# Patient Record
Sex: FEMALE | Race: WHITE | NOT HISPANIC OR LATINO | Employment: OTHER | URBAN - METROPOLITAN AREA
[De-identification: names, ages, dates, MRNs, and addresses within clinical notes are randomized per-mention and may not be internally consistent; named-entity substitution may affect disease eponyms.]

---

## 2017-04-24 ENCOUNTER — OFFICE VISIT (OUTPATIENT)
Dept: FAMILY MEDICINE | Facility: CLINIC | Age: 62
End: 2017-04-24
Payer: COMMERCIAL

## 2017-04-24 VITALS
OXYGEN SATURATION: 99 % | HEIGHT: 66 IN | HEART RATE: 59 BPM | WEIGHT: 162 LBS | DIASTOLIC BLOOD PRESSURE: 64 MMHG | SYSTOLIC BLOOD PRESSURE: 100 MMHG | TEMPERATURE: 98.3 F | BODY MASS INDEX: 26.03 KG/M2

## 2017-04-24 DIAGNOSIS — H16.9 KERATITIS: Primary | ICD-10-CM

## 2017-04-24 PROCEDURE — 99213 OFFICE O/P EST LOW 20 MIN: CPT | Performed by: FAMILY MEDICINE

## 2017-04-24 RX ORDER — POLYMYXIN B SULFATE AND TRIMETHOPRIM 1; 10000 MG/ML; [USP'U]/ML
SOLUTION OPHTHALMIC
Refills: 0 | COMMUNITY
Start: 2017-04-15

## 2017-04-24 NOTE — PROGRESS NOTES
SUBJECTIVE:                                                    Stephanie Acuna is a 61 year old female who presents to clinic today for the following health issues:      Eye(s) Problem     Onset: 12 days. She was seen in  and was told that there is some swelling in the eye conjunctiva.    Description:   Location: right  Pain: no   Redness: no     Accompanying Signs & Symptoms:  Discharge/mattering: YES  Swelling: YES  Visual changes: YES  Fever: no   Nasal Congestion: no   Bothered by bright lights: YES     History:   Trauma: no   Foreign body exposure: no     Precipitating factors:   Wearing contacts: no     Alleviating factors:  Improved by: drops        Therapies Tried and outcome: Allergy eye drops & antibiotics drops x 10 days.        Problem list and histories reviewed & adjusted, as indicated.  Additional history: as documented    Patient Active Problem List   Diagnosis     Seborrheic dermatitis     Chondromalacia of patella     Chronic sinusitis     Symptomatic menopausal or female climacteric states     CARDIOVASCULAR SCREENING; LDL GOAL LESS THAN 160     Osteopenia     Advanced directives, counseling/discussion     Eczema     IT band syndrome, right     Right knee pain     Past Surgical History:   Procedure Laterality Date     COLONOSCOPY  11/2009    normal, repeat 7-10 years     HC EXCIS BARTHOLIN GLAND/CYST  early 80s    left     HC KNEE SCOPE, DIAGNOSTIC  1995    right arthroscopy/lateral release     INJECTION BURSA  11/2009    left trochanteric bursa injection     SURGICAL HISTORY OF -   1971    right supraclavicular lipoma excised       Social History   Substance Use Topics     Smoking status: Never Smoker     Smokeless tobacco: Never Used     Alcohol use 0.0 oz/week     0 Standard drinks or equivalent per week      Comment: 2-5 glass of wine per week     Family History   Problem Relation Age of Onset     Breast Cancer Mother      onset 76     Prostate Cancer Father      onset age 76      "Hypertension Mother      Respiratory Father       of aspiration pneumonia     HEART DISEASE Mother      pacemaker mid 80s     Neurologic Disorder Father      dementia, onset 80s/seizures(Dilantin) - benign tumor/noise related hearing loss     CEREBROVASCULAR DISEASE Mother      onset age 90         Current Outpatient Prescriptions   Medication Sig Dispense Refill     trimethoprim-polymyxin b (POLYTRIM) ophthalmic solution PLACE 1 DROP INTO AFFECTED EYE QID FOR BACTERIAL CONJUNCTIVITIS  0     naphazoline (NAPHCON) 0.1 % ophthalmic solution 1 drop 4 times daily as needed for irritation       Omega-3 Fatty Acids (OMEGA-3 FISH OIL PO)        hydrocortisone valerate (WESTCORT) 0.2 % ointment Apply thin layer to affected areas on face twice daily as needed 45 g 2     zolpidem (AMBIEN) 5 MG tablet Take 0.5-1 tablets (2.5-5 mg) by mouth nightly as needed for sleep 30 tablet 5     ibuprofen (ADVIL) 200 MG tablet Take 600 mg by mouth daily as needed. Prior to golfing       VITAMINS/MINERALS OR TABS 1 TABLET DAILY 30 0     OSCAL 500/200 D-3 500-200 MG-UNIT OR TABS 1 TABLET DAILY 0 0     Allergies   Allergen Reactions     Peanuts [Nuts]      migraine headache     Erythro [Erythromycin] GI Disturbance     Shellfish Allergy Nausea and Vomiting       Reviewed and updated as needed this visit by clinical staff       Reviewed and updated as needed this visit by Provider         ROS:  C: NEGATIVE for fever, chills, change in weight  E/M: NEGATIVE for ear, mouth and throat problems  R: NEGATIVE for significant cough or SOB  CV: NEGATIVE for chest pain, palpitations or peripheral edema    OBJECTIVE:                                                    /64 (BP Location: Right arm, Cuff Size: Adult Regular)  Pulse 59  Temp 98.3  F (36.8  C) (Tympanic)  Ht 5' 6\" (1.676 m)  Wt 162 lb (73.5 kg)  SpO2 99%  BMI 26.15 kg/m2  Body mass index is 26.15 kg/(m^2).   GENERAL: healthy, alert, well nourished, well hydrated, no " distress  EYES: Eyes grossly normal to inspection, extraocular movements - intact, and PERRL. Examined eye with fluorescin drops. No tears noted.    HENT: ear canals- normal; TMs- normal; Nose- normal; Mouth- no ulcers, no lesions  NECK: no tenderness, no adenopathy, no asymmetry, no masses, no stiffness; thyroid- normal to palpation  RESP: lungs clear to auscultation - no rales, no rhonchi, no wheezes  CV: regular rates and rhythm, normal S1 S2, no S3 or S4 and no murmur, no click or rub -         ASSESSMENT/PLAN:                                                        ICD-10-CM    1. Keratitis H16.9      Improved symptoms. Stop antibiotic eye drops.   Resume anti allergy eye drops.  If any worsening of symptoms noted, recommended to notify me back.  Follow up with Provider - as needed     Harsha Meyer MD  [facility]

## 2017-04-24 NOTE — NURSING NOTE
"Chief Complaint   Patient presents with     Eye Problem       Initial /64 (BP Location: Right arm, Cuff Size: Adult Regular)  Pulse 59  Temp 98.3  F (36.8  C) (Tympanic)  Ht 5' 6\" (1.676 m)  Wt 162 lb (73.5 kg)  SpO2 99%  BMI 26.15 kg/m2 Estimated body mass index is 26.15 kg/(m^2) as calculated from the following:    Height as of this encounter: 5' 6\" (1.676 m).    Weight as of this encounter: 162 lb (73.5 kg).  Medication Reconciliation: complete  "

## 2017-04-24 NOTE — MR AVS SNAPSHOT
"              After Visit Summary   4/24/2017    Stephanie Acuna    MRN: [id number]           Patient Information     Date Of Birth          1955        Visit Information        Provider Department      4/24/2017 10:40 AM Harsha Meyer MD [facility] [place]        Today's Diagnoses     Keratitis    -  1       Follow-ups after your visit        Who to contact     If you have questions or need follow up information about today's clinic visit or your schedule please contact [facility] [place] directly at [phone].  Normal or non-critical lab and imaging results will be communicated to you by Survatahart, letter or phone within 4 business days after the clinic has received the results. If you do not hear from us within 7 days, please contact the clinic through Jareet or phone. If you have a critical or abnormal lab result, we will notify you by phone as soon as possible.  Submit refill requests through ION Signature or call your pharmacy and they will forward the refill request to us. Please allow 3 business days for your refill to be completed.          Additional Information About Your Visit        MyChart Information     ION Signature gives you secure access to your electronic health record. If you see a primary care provider, you can also send messages to your care team and make appointments. If you have questions, please call your primary care clinic.  If you do not have a primary care provider, please call [phone] and they will assist you.        Care EveryWhere ID     This is your Care EveryWhere ID. This could be used by other organizations to access your [facility] medical records  [id number]        Your Vitals Were     Pulse Temperature Height Pulse Oximetry BMI (Body Mass Index)       59 98.3  F (36.8  C) (Tympanic) 5' 6\" (1.676 m) 99% 26.15 kg/m2        Blood Pressure from Last 3 Encounters:   04/24/17 100/64   08/01/16 97/64   09/30/15 105/63    Weight from Last 3 Encounters: "   04/24/17 162 lb (73.5 kg)   08/01/16 158 lb 6.4 oz (71.8 kg)   09/30/15 154 lb 6 oz (70 kg)              Today, you had the following     No orders found for display         Today's Medication Changes          These changes are accurate as of: 4/24/17 11:59 PM.  If you have any questions, ask your nurse or doctor.               Stop taking these medicines if you haven't already. Please contact your care team if you have questions.     meclizine 25 MG tablet   Commonly known as:  ANTIVERT   Stopped by:  Harsha Meyer MD                    Primary Care Provider Office Phone # Fax #    Harsha Meyer [phone]       [address]  [place] [address]        Thank you!     Thank you for choosing [facility]  for your care. Our goal is always to provide you with excellent care. Hearing back from our patients is one way we can continue to improve our services. Please take a few minutes to complete the written survey that you may receive in the mail after your visit with us. Thank you!             Your Updated Medication List - Protect others around you: Learn how to safely use, store and throw away your medicines at www.disposemymeds.org.          This list is accurate as of: 4/24/17 11:59 PM.  Always use your most recent med list.                   Brand Name Dispense Instructions for use    ADVIL 200 MG tablet   Generic drug:  ibuprofen      Take 600 mg by mouth daily as needed. Prior to golfing       hydrocortisone valerate 0.2 % ointment    WESTCORT    45 g    Apply thin layer to affected areas on face twice daily as needed       naphazoline 0.1 % ophthalmic solution    NAPHCON     1 drop 4 times daily as needed for irritation       OMEGA-3 FISH OIL PO          OSCAL 500/200 D-3 500-200 MG-UNIT per tablet   Generic drug:  calcium carb 1250 mg (500 mg [place])/vitamin D 200 unit     0    1 TABLET DAILY       trimethoprim-polymyxin b ophthalmic  solution    POLYTRIM     PLACE 1 DROP INTO AFFECTED EYE QID FOR BACTERIAL CONJUNCTIVITIS       vitamin  s/Minerals Tabs     30    1 TABLET DAILY       zolpidem 5 MG tablet    AMBIEN    30 tablet    Take 0.5-1 tablets (2.5-5 mg) by mouth nightly as needed for sleep

## 2017-06-19 ENCOUNTER — TELEPHONE (OUTPATIENT)
Dept: FAMILY MEDICINE | Facility: CLINIC | Age: 62
End: 2017-06-19

## 2017-06-19 DIAGNOSIS — H16.9 KERATITIS: Primary | ICD-10-CM

## 2017-06-19 NOTE — TELEPHONE ENCOUNTER
Referral placed.    Notify patient.     Harsha Meyer MD  [facility] [place]

## 2017-06-19 NOTE — TELEPHONE ENCOUNTER
4/24/17 OV note:    ASSESSMENT/PLAN:             ICD-10-CM     1. Keratitis H16.9        Improved symptoms. Stop antibiotic eye drops.   Resume anti allergy eye drops.  If any worsening of symptoms noted, recommended to notify me back.  Follow up with Provider - as needed       Patient calling for referral. States eyedrops have not been helpful. Was told at [facility] that if medication did not help PCP would refer her to an eye doctor. Please advise.     Triage to call with response [phone] okay to leave detailed message.     Samia Sanchez RN   [facility] - Triage

## 2017-06-19 NOTE — TELEPHONE ENCOUNTER
Your provider has referred you to: N: [facility] [place] [phone]  Dr. Shepherd.   [email or web address]    Left detailed message (we have consent) providing referral information. Encouraged patient to call with any questions or concerns.      Samia Sanchez RN   [facility] - Triage

## 2017-10-02 ENCOUNTER — OFFICE VISIT (OUTPATIENT)
Dept: FAMILY MEDICINE | Facility: CLINIC | Age: 62
End: 2017-10-02
Payer: COMMERCIAL

## 2017-10-02 DIAGNOSIS — D48.5 NEOPLASM OF UNCERTAIN BEHAVIOR OF SKIN: Primary | ICD-10-CM

## 2017-10-02 DIAGNOSIS — L21.9 SEBORRHEIC DERMATITIS: ICD-10-CM

## 2017-10-02 DIAGNOSIS — D04.39 SQUAMOUS CELL CARCINOMA IN SITU OF SKIN OF NOSE: ICD-10-CM

## 2017-10-02 PROCEDURE — 99000 SPECIMEN HANDLING OFFICE-LAB: CPT | Performed by: FAMILY MEDICINE

## 2017-10-02 PROCEDURE — 99213 OFFICE O/P EST LOW 20 MIN: CPT | Mod: 25 | Performed by: FAMILY MEDICINE

## 2017-10-02 PROCEDURE — 88305 TISSUE EXAM BY PATHOLOGIST: CPT | Performed by: FAMILY MEDICINE

## 2017-10-02 PROCEDURE — 11310 SHAVE SKIN LESION 0.5 CM/<: CPT | Performed by: FAMILY MEDICINE

## 2017-10-02 RX ORDER — FLUOCINONIDE TOPICAL SOLUTION USP, 0.05% 0.5 MG/ML
SOLUTION TOPICAL
Qty: 60 ML | Refills: 0 | Status: SHIPPED | OUTPATIENT
Start: 2017-10-02

## 2017-10-02 RX ORDER — EVE PRIMROSE/LINOLEIC/G-LENIC 1000 MG
CAPSULE ORAL
COMMUNITY

## 2017-10-02 NOTE — MR AVS SNAPSHOT
"              After Visit Summary   10/2/2017    Stephanie Acuna    MRN: [id number]           Patient Information     Date Of Birth          1955        Visit Information        Provider Department      10/2/2017 2:00 PM Andie Craig MD [facility] - Primary Care Skin        Today's Diagnoses     Neoplasm of uncertain behavior of skin    -  1    Seborrheic dermatitis          Care Instructions    FUTURE APPOINTMENTS  Follow up per pathology report.  Follow up as needed if scalp symptoms not improving.    WOUND CARE INSTRUCTIONS  1. After 24 hours, change dressing daily.  2. Wash hands before every dressing change.  3. Wash the wound area with a mild soap, then rinse  4. Gently pat dry with a sterile gauze or Q-tip.  5. Apply Vaseline or Aquaphor only over entire wound. Do NOT use Neosporin - as many people react to neomycin.  6. Finally, cover with a bandage or sterile non-stick gauze with micropore paper tape.  7. Repeat once daily until wound has healed.    Do not let the wound dry out.  The wound will heal faster and with better cosmetic results if routinely kept moist with step #5. It is a false belief that a wound heals better when it is exposed to air and allowed to dry out.      Soap, water and shampoo will not hurt this area.    Do not go swimming or take baths, but showering is encouraged.    Limit use of the area where the procedure was done for a few days to allow for optimal healing.    If you experience bleeding:  Repeat steps #2-5 and hold firm pressure on the area for 10 minutes without checking to see if the bleeding has stopped. \"Checking\" pulls off the protective wound clot and restarts the bleeding all over again. Re-apply pressure for 10 minutes if necessary to stop bleeding.  Use additional sterile gauze and tape to maintain pressure once bleeding has stopped.    Signs of Infection:  Infection can occur in any area where skin has been disrupted.  If you notice " persistent redness, swelling, colored drainage, increasing pain, fever or other signs of infection, please call us at: [phone] and ask to have me or my colleague paged. We will call you back to discuss.    Pathology Results:  You will be notified, generally via letter or MyChart, in approximately 10 days. If there is anything we need to discuss or further treatment needed, I will call you to discuss it.    Skin tissue can be some of the most challenging tissue for pathologists to examine, therefore we may use a specialist outside the [facility] system to read certain submitted tissue samples. When submitted to these outside specialists, [facility] will notify you that your tissue sample result has returned. However, this only means that the tissue sample has been sent to the specialist. These results are not available in [facility], so I will contact you when I receive the final report.    PATIENT INFORMATION : WOUNDS  During the healing process you will notice a number of changes. All wounds develop a small halo of redness surrounding the wound.  This means healing is occurring. Severe itching with extensive redness usually indicates sensitivity to the ointment or bandage tape used to dress the wound.  You should call our office if this develops.      Swelling  and/or discoloration around your surgical site is common, particularly when performed around the eye.    All wounds normally drain.  The larger the wound the more drainage there will be.  After 7-10 days, you will notice the wound beginning to shrink and new skin will begin to grow.  The wound is healed when you can see skin has formed over the entire area.  A healed wound has a healthy, shiny look to the surface and is red to dark pink in color to normalize.  Wounds may take approximately 4-6 weeks to heal.  Larger wounds may take 6-8 weeks. After the wound is healed you may discontinue dressing changes.    You may experience a sensation of tightness as your  wound heals. This is normal and will gradually subside.    Your healed wound may be sensitive to temperature changes. This sensitivity improves with time, but if you re having a lot of discomfort, try to avoid temperature extremes.    Patients frequently experience itching after their wound appears to have healed because of the continue healing under the skin.  Plain Vaseline will help relieve the itching.      TOPICAL STEROID INSTRUCTIONS  Fluocinonide 0.05% solution.    Apply a few drops and rub into the affected areas on the scalp, at bedtime for 10-14 days.    Not to be used on face or groin.    Topical steroid use is for short-term treatment only. If after initial treatment, you are using this for prolonged periods of time, return to clinic for re-evaluation of treatment.          Follow-ups after your visit        Who to contact     If you have questions or need follow up information about today's clinic visit or your schedule please contact [facility] directly at [phone].  Normal or non-critical lab and imaging results will be communicated to you by Guardity Technologieshart, letter or phone within 4 business days after the clinic has received the results. If you do not hear from us within 7 days, please contact the clinic through Training Advisort or phone. If you have a critical or abnormal lab result, we will notify you by phone as soon as possible.  Submit refill requests through Sanook or call your pharmacy and they will forward the refill request to us. Please allow 3 business days for your refill to be completed.          Additional Information About Your Visit        [employer] Information     Sanook gives you secure access to your electronic health record. If you see a primary care provider, you can also send messages to your care team and make appointments. If you have questions, please call your primary care clinic.  If you do not have a primary care provider, please call [phone] and they  will assist you.        Care EveryWhere ID     This is your Care EveryWhere ID. This could be used by other organizations to access your [facility] medical records  [id number]         Blood Pressure from Last 3 Encounters:   04/24/17 100/64   08/01/16 97/64   09/30/15 105/63    Weight from Last 3 Encounters:   04/24/17 162 lb (73.5 kg)   08/01/16 158 lb 6.4 oz (71.8 kg)   09/30/15 154 lb 6 oz (70 kg)              We Performed the Following     CL SURG PATH [facility] DERM     SHAV SKIN LESION FACE/EARS <=0.5 CM          Today's Medication Changes          These changes are accurate as of: 10/2/17  2:20 PM.  If you have any questions, ask your nurse or doctor.               Start taking these medicines.        Dose/Directions    fluocinonide 0.05 % solution   Commonly known as:  LIDEX   Used for:  Seborrheic dermatitis   Started by:  Andie Craig MD        Apply sparingly to affected area on scalp twice daily as needed.  Do not apply to face.   Quantity:  60 mL   Refills:  0            Where to get your medicines      These medications were sent to [id number] - [place], [place] - [address]  [address] [place] [address]     Phone:  [phone]     fluocinonide 0.05 % solution                Primary Care Provider Office Phone # Fax #    Harsha Meyer [phone]       [address]  [place] [address]        Equal Access to Services     CHAYO REDDY AH: Hadii leo ku hadasho Soomaali, waaxda luqadaha, qaybta kaalmada maeegyada, ania dobson. So [facility] [phone].    ATENCIÓN: Si habla español, tiene a banegas disposición servicios gratuitos de asistencia lingüística. Llame al [phone].    We comply with applicable federal civil rights laws and [place] laws. We do not discriminate on the basis of race, color, national origin, age, disability, sex, sexual orientation, or gender identity.            Thank you!     Thank you for  choosing [facility]  for your care. Our goal is always to provide you with excellent care. Hearing back from our patients is one way we can continue to improve our services. Please take a few minutes to complete the written survey that you may receive in the mail after your visit with us. Thank you!             Your Updated Medication List - Protect others around you: Learn how to safely use, store and throw away your medicines at www.disposemymeds.org.          This list is accurate as of: 10/2/17  2:20 PM.  Always use your most recent med list.                   Brand Name Dispense Instructions for use Diagnosis    ADVIL 200 MG tablet   Generic drug:  ibuprofen      Take 600 mg by mouth daily as needed. Prior to golfing        Evening Primrose Oil 1000 MG Caps           fluocinonide 0.05 % solution    LIDEX    60 mL    Apply sparingly to affected area on scalp twice daily as needed.  Do not apply to face.    Seborrheic dermatitis       hydrocortisone valerate 0.2 % ointment    WESTCORT    45 g    Apply thin layer to affected areas on face twice daily as needed    Eczema, unspecified type       naphazoline 0.1 % ophthalmic solution    NAPHCON     1 drop 4 times daily as needed for irritation        OMEGA-3 FISH OIL PO           OSCAL 500/200 D-3 500-200 MG-UNIT per tablet   Generic drug:  calcium carb 1250 mg (500 mg [place])/vitamin D 200 unit     0    1 TABLET DAILY        trimethoprim-polymyxin b ophthalmic solution    POLYTRIM     PLACE 1 DROP INTO AFFECTED EYE QID FOR BACTERIAL CONJUNCTIVITIS        vitamin  s/Minerals Tabs     30    1 TABLET DAILY        zolpidem 5 MG tablet    AMBIEN    30 tablet    Take 0.5-1 tablets (2.5-5 mg) by mouth nightly as needed for sleep    Sleep disturbance

## 2017-10-02 NOTE — PATIENT INSTRUCTIONS
"FUTURE APPOINTMENTS  Follow up per pathology report.  Follow up as needed if scalp symptoms not improving.    WOUND CARE INSTRUCTIONS  1. After 24 hours, change dressing daily.  2. Wash hands before every dressing change.  3. Wash the wound area with a mild soap, then rinse  4. Gently pat dry with a sterile gauze or Q-tip.  5. Apply Vaseline or Aquaphor only over entire wound. Do NOT use Neosporin - as many people react to neomycin.  6. Finally, cover with a bandage or sterile non-stick gauze with micropore paper tape.  7. Repeat once daily until wound has healed.    Do not let the wound dry out.  The wound will heal faster and with better cosmetic results if routinely kept moist with step #5. It is a false belief that a wound heals better when it is exposed to air and allowed to dry out.      Soap, water and shampoo will not hurt this area.    Do not go swimming or take baths, but showering is encouraged.    Limit use of the area where the procedure was done for a few days to allow for optimal healing.    If you experience bleeding:  Repeat steps #2-5 and hold firm pressure on the area for 10 minutes without checking to see if the bleeding has stopped. \"Checking\" pulls off the protective wound clot and restarts the bleeding all over again. Re-apply pressure for 10 minutes if necessary to stop bleeding.  Use additional sterile gauze and tape to maintain pressure once bleeding has stopped.    Signs of Infection:  Infection can occur in any area where skin has been disrupted.  If you notice persistent redness, swelling, colored drainage, increasing pain, fever or other signs of infection, please call us at: [phone] and ask to have me or my colleague paged. We will call you back to discuss.    Pathology Results:  You will be notified, generally via letter or MyChart, in approximately 10 days. If there is anything we need to discuss or further treatment needed, I will call you to discuss it.    Skin tissue can be " some of the most challenging tissue for pathologists to examine, therefore we may use a specialist outside the [employer] system to read certain submitted tissue samples. When submitted to these outside specialists, [employer] will notify you that your tissue sample result has returned. However, this only means that the tissue sample has been sent to the specialist. These results are not available in [employer], so I will contact you when I receive the final report.    PATIENT INFORMATION : WOUNDS  During the healing process you will notice a number of changes. All wounds develop a small halo of redness surrounding the wound.  This means healing is occurring. Severe itching with extensive redness usually indicates sensitivity to the ointment or bandage tape used to dress the wound.  You should call our office if this develops.      Swelling  and/or discoloration around your surgical site is common, particularly when performed around the eye.    All wounds normally drain.  The larger the wound the more drainage there will be.  After 7-10 days, you will notice the wound beginning to shrink and new skin will begin to grow.  The wound is healed when you can see skin has formed over the entire area.  A healed wound has a healthy, shiny look to the surface and is red to dark pink in color to normalize.  Wounds may take approximately 4-6 weeks to heal.  Larger wounds may take 6-8 weeks. After the wound is healed you may discontinue dressing changes.    You may experience a sensation of tightness as your wound heals. This is normal and will gradually subside.    Your healed wound may be sensitive to temperature changes. This sensitivity improves with time, but if you re having a lot of discomfort, try to avoid temperature extremes.    Patients frequently experience itching after their wound appears to have healed because of the continue healing under the skin.  Plain Vaseline will help relieve the itching.      TOPICAL STEROID  INSTRUCTIONS  Fluocinonide 0.05% solution.    Apply a few drops and rub into the affected areas on the scalp, at bedtime for 10-14 days.    Not to be used on face or groin.    Topical steroid use is for short-term treatment only. If after initial treatment, you are using this for prolonged periods of time, return to clinic for re-evaluation of treatment.

## 2017-10-02 NOTE — PROGRESS NOTES
[facility] - PRIMARY CARE SKIN    CC : Lesion(s)  SUBJECTIVE:                                                    Stephanie Acuna is a 61 year old female who presents to clinic today because of a lesion on the nose.    Bothersome lesions noticed by the patient or other skin concerns :  Issue One : There is a red spot on the nose. No therapies tried; she denies trying to squeeze or pop the lesion.  Onset : 9/14/17-9/15/17.  Enlarging : NO.  Bleeding : NO  Itchy or irritating : YES - most prominent at night.  Pain or tenderness : NO.  Changing color : NO.    Issue Two : She also reports a chronic rash on the back of the neck and head over at least the last 1 year. She describes bumpy, itchy and crusting nature of the rash. This rash is along the hairline and extends into the scalp hair.  Onset : 1+ year ago.    Products used: Pantene shampoo. OTC anti-dandruff shampoo used many years ago.    Personal history of skin cancer : NO.  Family history of skin cancer : NO. ?Eczema vs psoriasis vs seborrheic dermatitis in father     Sun Exposure History  Previous history of significant sun exposure:  Blistering sunburns : YES - when younger  Tanning beds : NO.  Sunscreen Use : YES, SPF : 30+.  Sun-protective clothing use : No  Wide-brimmed hats : Yes  Sunglasses : Yes  Seeks shade : No    Occupation : indoor    Refer to electronic medical record (EMR) for past medical history and medications.    INTEGUMENTARY/SKIN: POSITIVE for changing lesion and rash  ROS : 14 point review of systems was negative except the symptoms listed above in the HPI.    This document serves as a record of the services and decisions personally performed and made by Eloisa Craig MD. It was created on her behalf by Sukhjinder Méndez, a trained medical scribe.  The creation of this document is based on the scribe's personal observations and the provider's statements to the medical scribe.  Sukhjinder Méndez, October 2, 2017 2:02 PM      OBJECTIVE:                "                                     GENERAL: healthy, alert and no distress  SKIN: Pena Skin Type - II.  Face, Scalp, Neck were examined. The dermatoscope was used to help evaluate pigmented lesions.  Skin Pertinent Findings:  Right side of distal nose : 3 mm in size, raised, smooth, pink, firm lesion. ? Basal cell carcinoma ? Cyst ? Other      Occipital scalp : Scattered erythema and light scaling, no plaque formation    Diagnostic Test Results:  none           ASSESSMENT:                                                      Encounter Diagnoses   Name Primary?     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  Follow up as needed if scalp symptoms not improving.    WOUND CARE INSTRUCTIONS  1. After 24 hours, change dressing daily.  2. Wash hands before every dressing change.  3. Wash the wound area with a mild soap, then rinse  4. Gently pat dry with a sterile gauze or Q-tip.  5. Apply Vaseline or Aquaphor only over entire wound. Do NOT use Neosporin - as many people react to neomycin.  6. Finally, cover with a bandage or sterile non-stick gauze with micropore paper tape.  7. Repeat once daily until wound has healed.    Do not let the wound dry out.  The wound will heal faster and with better cosmetic results if routinely kept moist with step #5. It is a false belief that a wound heals better when it is exposed to air and allowed to dry out.      Soap, water and shampoo will not hurt this area.    Do not go swimming or take baths, but showering is encouraged.    Limit use of the area where the procedure was done for a few days to allow for optimal healing.    If you experience bleeding:  Repeat steps #2-5 and hold firm pressure on the area for 10 minutes without checking to see if the bleeding has stopped. \"Checking\" pulls off the protective " wound clot and restarts the bleeding all over again. Re-apply pressure for 10 minutes if necessary to stop bleeding.  Use additional sterile gauze and tape to maintain pressure once bleeding has stopped.    Signs of Infection:  Infection can occur in any area where skin has been disrupted.  If you notice persistent redness, swelling, colored drainage, increasing pain, fever or other signs of infection, please call us at: [phone] and ask to have me or my colleague paged. We will call you back to discuss.    Pathology Results:  You will be notified, generally via letter or MyChart, in approximately 10 days. If there is anything we need to discuss or further treatment needed, I will call you to discuss it.    Skin tissue can be some of the most challenging tissue for pathologists to examine, therefore we may use a specialist outside the [employer] system to read certain submitted tissue samples. When submitted to these outside specialists, [employer] will notify you that your tissue sample result has returned. However, this only means that the tissue sample has been sent to the specialist. These results are not available in [employer], so I will contact you when I receive the final report.    PATIENT INFORMATION : WOUNDS  During the healing process you will notice a number of changes. All wounds develop a small halo of redness surrounding the wound.  This means healing is occurring. Severe itching with extensive redness usually indicates sensitivity to the ointment or bandage tape used to dress the wound.  You should call our office if this develops.      Swelling  and/or discoloration around your surgical site is common, particularly when performed around the eye.    All wounds normally drain.  The larger the wound the more drainage there will be.  After 7-10 days, you will notice the wound beginning to shrink and new skin will begin to grow.  The wound is healed when you can see skin has formed over the entire area.   A healed wound has a healthy, shiny look to the surface and is red to dark pink in color to normalize.  Wounds may take approximately 4-6 weeks to heal.  Larger wounds may take 6-8 weeks. After the wound is healed you may discontinue dressing changes.    You may experience a sensation of tightness as your wound heals. This is normal and will gradually subside.    Your healed wound may be sensitive to temperature changes. This sensitivity improves with time, but if you re having a lot of discomfort, try to avoid temperature extremes.    Patients frequently experience itching after their wound appears to have healed because of the continue healing under the skin.  Plain Vaseline will help relieve the itching.      TOPICAL STEROID INSTRUCTIONS  Fluocinonide 0.05% solution.    Apply a few drops and rub into the affected areas on the scalp, at bedtime for 10-14 days.    Not to be used on face or groin.    Topical steroid use is for short-term treatment only. If after initial treatment, you are using this for prolonged periods of time, return to clinic for re-evaluation of treatment.        PROCEDURES:                                                    Name : Shave Excision  Indication : Excision of tissue for pathology evaluation.  Location(s) : Right side of distal nose : 3 mm in size, raised, smooth, pink, firm lesion. ? Basal cell carcinoma ? Cyst ? Other.  Completed by : Eloisa Craig MD  Photo Taken : no.  Anesthesia : Patient was anesthetized by infiltrating the area surrounding the lesion with 1% lidocaine.   epinephrine 1:175002 : Yes.  Buffered with bicarbonate : Yes.  Note : Discussed the risk of pain, infection, scarring, hypo- or hyperpigmentation and recurrence or need for re-treatment. The benefits of treatment and alternative treatments were also discussed.    During this procedure, the universal protocol was utilized. The patient's identity was confirmed by no less than two patient identifiers, correct  procedure was verified, correct site was verified and marked as applicable and a final pause was completed.    Sterile technique was used throughout the procedure. The skin was cleaned and prepped with surgical cleanser. Once adequate anesthesia was obtained, the lesion was removed with a deep scallop shave procedure. The specimen was sent to pathology.    Direct pressure and aluminum chloride and monopolar cautery was applied for hemostasis. No bleeding was present upon the completion of the procedure. The wound was coated with antibacterial ointment. A dry sterile dressing was applied. Patient tolerated the procedure well and left in satisfactory condition.    Primary provider and referring provider will be informed regarding the tissue report when it returns.      The information in this document, created by the medical scribe for me, accurately reflects the services I personally performed and the decisions made by me. I have reviewed and approved this document for accuracy prior to leaving the patient care area.  Eloisa Craig MD October 2, 2017 2:02 PM  [facility] - PRIMARY CARE SKIN

## 2017-10-06 ENCOUNTER — TELEPHONE (OUTPATIENT)
Dept: FAMILY MEDICINE | Facility: CLINIC | Age: 62
End: 2017-10-06

## 2017-10-06 NOTE — LETTER
October 6, 2017    Stephanie Acuna  [address]  [place] [address]        Dear Stephanie,    This is a letter to summarize our phone call on October 6, 2017. 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 The following recommendations were made:     Consult with Dr. Talya Sesay, [facility] has been requested on your behalf . His office will contact you within 5 business days.     Yearly skin exams      Schedule for a total body skin exam with myself.    Thank you for allowing me to be involved in your health care and for choosing [facility].  If you have any questions or concerns please feel free to contact me, [phone].      Sincerely,      Andie Craig M.D.

## 2017-10-06 NOTE — TELEPHONE ENCOUNTER
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 Talya Sesay        Patient is leaving for [place] for the winter

## 2017-10-06 NOTE — PROGRESS NOTES
ADDENDUM:                                                    October 6, 2017 7:00 AM  Pathology report results:

## 2017-10-06 NOTE — LETTER
[facility]  [address]  [address]  [phone]        10/6/2017       Stephanie Acuna  [address]  [address]  [place] [address]      Dear Stephanie:    You are scheduled for Mohs Surgery on: 10/26/17 .    Please check in at [facility], [address].     You don't need to arrive more than 5-10 minutes prior to your appointment time.     Be sure to eat a good breakfast and bathe and wash your hair prior to surgery.     If you are taking any anti-coagulants that are prescribed by your Doctor (such as Coumadin/Warfarin, Plavix, Aspirin, Ibuprofen), please continue taking them.     However, if you are taking anti-coagulants over the counter without a Doctor's order for a medical condition, please discontinue them 10 days prior to surgery.     Please wear loose comfortable clothing as it could possibly be 4-6 hours until your surgery is completed depending upon how many layers of tissue need to be removed.      Thank you,    PIPPA Santoro MD

## 2017-10-06 NOTE — TELEPHONE ENCOUNTER
Appointment scheduled 10/26/17   Letter sent    Ana Muñiz RN  [facility] [place]  [phone]

## 2017-10-26 ENCOUNTER — OFFICE VISIT (OUTPATIENT)
Dept: DERMATOLOGY | Facility: CLINIC | Age: 62
End: 2017-10-26
Payer: COMMERCIAL

## 2017-10-26 VITALS
DIASTOLIC BLOOD PRESSURE: 62 MMHG | SYSTOLIC BLOOD PRESSURE: 102 MMHG | HEART RATE: 68 BPM | OXYGEN SATURATION: 100 % | WEIGHT: 159 LBS | HEIGHT: 66 IN | BODY MASS INDEX: 25.55 KG/M2

## 2017-10-26 DIAGNOSIS — L82.1 SK (SEBORRHEIC KERATOSIS): Primary | ICD-10-CM

## 2017-10-26 DIAGNOSIS — D04.39 SQUAMOUS CELL CARCINOMA IN SITU OF SKIN OF NASAL TIP: ICD-10-CM

## 2017-10-26 DIAGNOSIS — D18.00 ANGIOMA: ICD-10-CM

## 2017-10-26 DIAGNOSIS — L81.4 LENTIGO: ICD-10-CM

## 2017-10-26 PROCEDURE — 11313 SHAVE SKIN LESION >2.0 CM: CPT | Mod: 51 | Performed by: DERMATOLOGY

## 2017-10-26 PROCEDURE — 99243 OFF/OP CNSLTJ NEW/EST LOW 30: CPT | Mod: 25 | Performed by: DERMATOLOGY

## 2017-10-26 PROCEDURE — 17311 MOHS 1 STAGE H/N/HF/G: CPT | Performed by: DERMATOLOGY

## 2017-10-26 NOTE — NURSING NOTE
"Chief Complaint   Patient presents with     Derm Problem     MOHS       Initial /62  Pulse 68  Ht 1.676 m (5' 6\")  Wt 72.1 kg (159 lb)  SpO2 100%  BMI 25.66 kg/m2 Estimated body mass index is 25.66 kg/(m^2) as calculated from the following:    Height as of this encounter: 1.676 m (5' 6\").    Weight as of this encounter: 72.1 kg (159 lb).  Medication Reconciliation: complete    "

## 2017-10-26 NOTE — PATIENT INSTRUCTIONS
Open Wound Care     for __NOSE____________        ? No strenuous activity for 48 hours    ? Take Tylenol as needed for discomfort.                                                .         ? Do not drink alcoholic beverages for 48 hours.    ? Keep the pressure bandage in place for 24 hours. If the bandage becomes blood tinged or loose, reinforce it with gauze and tape.        (Refer to the reverse side of this page for management of bleeding).    ? Remove bandage in 24 hours and begin wound care as follows:     1. Clean area with tap water using a Q tip or gauze pad, (shower / bathe normally)  2. Dry wound with Q tip or gauze pad  3. Apply Aquaphor, Vaseline, Polysporin or Bacitracin Ointment with a Q tip    Do NOT use Neosporin Ointment *  4. Cover the wound with a band-aid or nonstick gauze pad and paper tape.  5. Repeat wound care once a day until wound is completely healed.    It is an old wives tale that a wound heals better when it is exposed to air and allowed to dry out. The wound will heal faster with a better cosmetic result if it is kept moist with ointment and covered with a bandage.  Do not let the wound dry out.      Supplies Needed:                Qtips or gauze pads                Polysporin or Bacitracin Ointment                Bandaids or nonstick gauze pads and paper tape    Wound care kits and brown paper tape are available for purchase at   the pharmacy.    BLEEDIN. Use tightly rolled up gauze or cloth to apply direct pressure over the bandage for 20   minutes.  2. Reapply pressure for an additional 20 minutes if necessary  3. Call the office or go to the nearest emergency room if pressure fails to stop the bleeding.  4. Use additional gauze and tape to maintain pressure once the bleeding has stopped.  5. Begin wound care 24 hours after surgery as directed.                  WOUND HEALING    1. One week after surgery a pink / red halo will form around the outside of the wound.   This is new  skin.  2. The center of the wound will appear yellowish white and produce some drainage.  3. The pink halo will slowly migrate in toward the center of the wound until the wound is covered with new shiny pink skin.  4. There will be no more drainage when the wound is completely healed.  5. It will take six months to one year for the redness to fade.  6. The scar may be itchy, tight and sensitive to extreme temperatures for a year after the surgery.  7. Massaging the area several times a day for several minutes after the wound is completely healed will help the scar soften and normalize faster. Begin massage only after healing is complete.      In case of emergency call: Dr Santoro: [phone]     [facility]: [phone]    [facility]: [phone]

## 2017-10-26 NOTE — PROGRESS NOTES
Stephanie Acuna , a 61 year old year old female patient, I was asked to see by Dr. 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  Patient states this has been present for a while.  Location nose.  Patient reports the following symptoms:  tender .  Patient reports the following previous treatments none.  Patient reports the following modifying factors none.  Associated symptoms: none.  Patient has no other skin complaints today.  Remainder of the HPI, Meds, PMH, Allergies, FH, and SH was reviewed in chart.      Past Medical History:   Diagnosis Date     Abscess of Bartholin's gland     I&D, marsupalization x 2     Chondromalacia of patella     right knee arthroscopy/lateral release     Chronic sinusitis     recurrent sinusitis     Eczema      Enlargement of sternoclavicular joint 2003    painless right sternoclavicular joint arthritis     Migraine     stopped after menopause     Osteopenia 2010    minimal, femoral neck     Seborrheic dermatitis 2004    face     Shrimp allergy     with only GI symtpoms     Symptomatic menopausal or female climacteric states 2005     Tinnitus of right ear        Past Surgical History:   Procedure Laterality Date     COLONOSCOPY  2009    normal, repeat 7-10 years     HC EXCIS BARTHOLIN GLAND/CYST  early 80s    left     HC KNEE SCOPE, DIAGNOSTIC      right arthroscopy/lateral release     INJECTION BURSA  2009    left trochanteric bursa injection     SURGICAL HISTORY OF -       right supraclavicular lipoma excised        Family History   Problem Relation Age of Onset     Breast Cancer Mother      onset 76     Hypertension Mother      HEART DISEASE Mother      pacemaker mid 80s     CEREBROVASCULAR DISEASE Mother      onset age 90     Prostate Cancer Father      onset age 76     Respiratory Father       of aspiration pneumonia     Neurologic Disorder Father      dementia, onset 80s/seizures(Dilantin) - benign tumor/noise related hearing loss        Social History     Social History     Marital status:      Spouse name: Amandeep     Number of children: 0     Years of education: 16     Occupational History     teacher Retired     high school, 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[employer]      [employer]     Social History Main Topics     Smoking status: Never Smoker     Smokeless tobacco: Never Used     Alcohol use 0.0 oz/week     0 Standard drinks or equivalent per week      Comment: 2-5 glass of wine per week     Drug use: No     Sexual activity: Yes      Comment: same and only partner since 1970     Other Topics Concern      Service No     Blood Transfusions No     Caffeine Concern No     Occupational Exposure No     Hobby Hazards No     Sleep Concern Yes     chantel menopausal     Stress Concern No     Weight Concern No     Special Diet No     one yogurt per day, one glass milk per day     Back Care No     Exercise Yes     golf 3 x per week, treadmill 60 in 3 day per week, snow ski     Bike Helmet No     Seat Belt Yes     Self-Exams Yes     Social History Narrative    Lives with .  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  Now working with her  2 days per week - [employer].         Outpatient Encounter Prescriptions as of 10/26/2017   Medication Sig Dispense Refill     Evening Primrose Oil 1000 MG CAPS        fluocinonide (LIDEX) 0.05 % solution Apply sparingly to affected area on scalp twice daily as needed.  Do not apply to face. 60 mL 0     trimethoprim-polymyxin b (POLYTRIM) ophthalmic solution PLACE 1 DROP INTO AFFECTED EYE QID FOR BACTERIAL CONJUNCTIVITIS  0     naphazoline (NAPHCON) 0.1 % ophthalmic solution 1 drop 4 times daily as needed for irritation       Omega-3 Fatty Acids (OMEGA-3 FISH OIL PO)        hydrocortisone valerate (WESTCORT) 0.2 % ointment Apply thin layer to affected areas on face twice daily as needed 45 g 2     zolpidem (AMBIEN) 5 MG tablet Take 0.5-1 tablets (2.5-5  "mg) by mouth nightly as needed for sleep 30 tablet 5     ibuprofen (ADVIL) 200 MG tablet Take 600 mg by mouth daily as needed. Prior to golfing       VITAMINS/MINERALS OR TABS 1 TABLET DAILY 30 0     OSCAL 500/200 D-3 500-200 MG-UNIT OR TABS 1 TABLET DAILY 0 0     No facility-administered encounter medications on file as of 10/26/2017.              Review Of Systems  Skin: As above  Eyes: negative  Ears/Nose/Throat: negative  Respiratory: No shortness of breath, dyspnea on exertion, cough, or hemoptysis  Cardiovascular: negative  Gastrointestinal: negative  Genitourinary: negative  Musculoskeletal: negative  Neurologic: negative  Psychiatric: negative  Hematologic/Lymphatic/Immunologic: negative  Endocrine: negative      O:   NAD, WDWN, Alert & Oriented, Mood & Affect wnl, Vitals stable   Here today alone   /62  Pulse 68  Ht 1.676 m (5' 6\")  Wt 72.1 kg (159 lb)  SpO2 100%  BMI 25.66 kg/m2   General appearance leila ii   Vitals stable   Alert, oriented and in no acute distress     R nasal tip 8mm scaly papule   Stuck on papules and brown macules on trunk and ext    Red papules on neck       The remainder of expanded problem focused exam was unremarkable; the following areas were examined:  scalp/hair, conjunctiva/lids, face, neck, lips, chest      Eyes: Conjunctivae/lids:Normal     ENT: Lips, buccal mucosa, tongue: normal    MSK:Normal    Cardiovascular: peripheral edema none    Pulm: Breathing Normal    Lymph Nodes: No Head and Neck Lymphadenopathy     Neuro/Psych: Orientation:Normal; Mood/Affect:Normal      A/P:  1. Squamous cell carcinoma in situ nasal tip  2. Seborrheic keratosis, lentigo, angioma  BENIGN LESIONS DISCUSSED WITH PATIENT:  I discussed the specifics of tumor, prognosis, and genetics of benign lesions.  I explained that treatment of these lesions would be purely cosmetic and not medically neccessary.  I discussed with patient different removal options including excision, cautery and /or " laser.      Nature and genetics of benign skin lesions dicussed with patient.  Signs and Symptoms of skin cancer discussed with patient.  Patient encouraged to perform monthly skin exams.  UV precautions reviewed with patient.  Skin care regimen reviewed with patient: Eliminate harsh soaps, i.e. Dial, zest, irsih spring; Mild soaps such as Cetaphil or Dove sensitive skin, avoid hot or cold showers, aggressive use of emollients including vanicream, cetaphil or cerave discussed with patient.    Risks of non-melanoma skin cancer discussed with patient   Return to clinic 6 months      PROCEDURE NOTE  R nasal tip squamous cell carcinoma in situ   MOHS:   Location    After PGACAC discussed with patient, decision for Mohs surgery was made. Indication for Mohs was Location. Patient confirmed the site with Dr. Santoro.  After anesthesia with LEC, the tumor was excised using standard Mohs technique in 1 stages(s).  CLEAR MARGINS OBTAINED and Final defect size was 1.2 cm.       DERMABRASION: After PGACAC discussed with patient, decision for tangential excision and dermabrasion was made. After anesthesia with Lido/Epi/Clinda and prep with hibiclens, hypertrophic areas were tangentially excised and entire cosmetic unit was smoothed. Hemostasis was obtained with pressure. Patient tolerated procedure well. There were no complications and EBL minimal. Patient advised to keep abraded surfaces covered with generous ointment until healed, approximately 2 weeks. Return to office in 3 months or prn.

## 2017-10-26 NOTE — LETTER
10/26/2017         RE: Stephanie Aucna  [address]  [place] [address]        Dear Colleague,    Thank you for referring your patient, Stephanie Acuna, to the [facility]. Please see a copy of my visit note below.    Stephanie Acuna , a 61 year old year old female patient, I was asked to see by Dr. 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  Patient states this has been present for a while.  Location nose.  Patient reports the following symptoms:  tender .  Patient reports the following previous treatments none.  Patient reports the following modifying factors none.  Associated symptoms: none.  Patient has no other skin complaints today.  Remainder of the HPI, Meds, PMH, Allergies, FH, and SH was reviewed in chart.      Past Medical History:   Diagnosis Date     Abscess of Bartholin's gland 1980s    I&D, marsupalization x 2     Chondromalacia of patella 1995    right knee arthroscopy/lateral release     Chronic sinusitis     recurrent sinusitis     Eczema      Enlargement of sternoclavicular joint 2003    painless right sternoclavicular joint arthritis     Migraine     stopped after menopause     Osteopenia 2010    minimal, femoral neck     Seborrheic dermatitis 2004    face     Shrimp allergy     with only GI symtpoms     Symptomatic menopausal or female climacteric states 2005     Tinnitus of right ear        Past Surgical History:   Procedure Laterality Date     COLONOSCOPY  11/2009    normal, repeat 7-10 years     HC EXCIS BARTHOLIN GLAND/CYST  early 80s    left     HC KNEE SCOPE, DIAGNOSTIC  1995    right arthroscopy/lateral release     INJECTION BURSA  11/2009    left trochanteric bursa injection     SURGICAL HISTORY OF -   1971    right supraclavicular lipoma excised        Family History   Problem Relation Age of Onset     Breast Cancer Mother      onset 76     Hypertension Mother      HEART DISEASE Mother      pacemaker mid 80s      CEREBROVASCULAR DISEASE Mother      onset age 90     Prostate Cancer Father      onset age 76     Respiratory Father       of aspiration pneumonia     Neurologic Disorder Father      dementia, onset 80s/seizures(Dilantin) - benign tumor/noise related hearing loss       Social History     Social History     Marital status:      Spouse name: Amandeep     Number of children: 0     Years of education: 16     Occupational History     teacher Retired     high school, 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 [employer]     Social History Main Topics     Smoking status: Never Smoker     Smokeless tobacco: Never Used     Alcohol use 0.0 oz/week     0 Standard drinks or equivalent per week      Comment: 2-5 glass of wine per week     Drug use: No     Sexual activity: Yes      Comment: same and only partner since      Other Topics Concern      Service No     Blood Transfusions No     Caffeine Concern No     Occupational Exposure No     Hobby Hazards No     Sleep Concern Yes     chantel menopausal     Stress Concern No     Weight Concern No     Special Diet No     one yogurt per day, one glass milk per day     Back Care No     Exercise Yes     golf 3 x per week, treadmill 60 in 3 day per week, snow ski     Bike Helmet No     Seat Belt Yes     Self-Exams Yes     Social History Narrative    Lives with .  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         Outpatient Encounter Prescriptions as of 10/26/2017   Medication Sig Dispense Refill     Evening Primrose Oil 1000 MG CAPS        fluocinonide (LIDEX) 0.05 % solution Apply sparingly to affected area on scalp twice daily as needed.  Do not apply to face. 60 mL 0     trimethoprim-polymyxin b (POLYTRIM) ophthalmic solution PLACE 1 DROP INTO AFFECTED EYE QID FOR BACTERIAL CONJUNCTIVITIS  0     naphazoline (NAPHCON) 0.1 % ophthalmic solution 1 drop 4 times  "daily as needed for irritation       Omega-3 Fatty Acids (OMEGA-3 FISH OIL PO)        hydrocortisone valerate (WESTCORT) 0.2 % ointment Apply thin layer to affected areas on face twice daily as needed 45 g 2     zolpidem (AMBIEN) 5 MG tablet Take 0.5-1 tablets (2.5-5 mg) by mouth nightly as needed for sleep 30 tablet 5     ibuprofen (ADVIL) 200 MG tablet Take 600 mg by mouth daily as needed. Prior to golfing       VITAMINS/MINERALS OR TABS 1 TABLET DAILY 30 0     OSCAL 500/200 D-3 500-200 MG-UNIT OR TABS 1 TABLET DAILY 0 0     No facility-administered encounter medications on file as of 10/26/2017.              Review Of Systems  Skin: As above  Eyes: negative  Ears/Nose/Throat: negative  Respiratory: No shortness of breath, dyspnea on exertion, cough, or hemoptysis  Cardiovascular: negative  Gastrointestinal: negative  Genitourinary: negative  Musculoskeletal: negative  Neurologic: negative  Psychiatric: negative  Hematologic/Lymphatic/Immunologic: negative  Endocrine: negative      O:   NAD, WDWN, Alert & Oriented, Mood & Affect wnl, Vitals stable   Here today alone   /62  Pulse 68  Ht 1.676 m (5' 6\")  Wt 72.1 kg (159 lb)  SpO2 100%  BMI 25.66 kg/m2   General appearance leila ii   Vitals stable   Alert, oriented and in no acute distress     R nasal tip 8mm scaly papule   Stuck on papules and brown macules on trunk and ext    Red papules on neck       The remainder of expanded problem focused exam was unremarkable; the following areas were examined:  scalp/hair, conjunctiva/lids, face, neck, lips, chest      Eyes: Conjunctivae/lids:Normal     ENT: Lips, buccal mucosa, tongue: normal    MSK:Normal    Cardiovascular: peripheral edema none    Pulm: Breathing Normal    Lymph Nodes: No Head and Neck Lymphadenopathy     Neuro/Psych: Orientation:Normal; Mood/Affect:Normal      A/P:  1. Squamous cell carcinoma in situ nasal tip  2. Seborrheic keratosis, lentigo, angioma  BENIGN LESIONS DISCUSSED WITH PATIENT:  I " discussed the specifics of tumor, prognosis, and genetics of benign lesions.  I explained that treatment of these lesions would be purely cosmetic and not medically neccessary.  I discussed with patient different removal options including excision, cautery and /or laser.      Nature and genetics of benign skin lesions dicussed with patient.  Signs and Symptoms of skin cancer discussed with patient.  Patient encouraged to perform monthly skin exams.  UV precautions reviewed with patient.  Skin care regimen reviewed with patient: Eliminate harsh soaps, i.e. Dial, zest, irsih spring; Mild soaps such as Cetaphil or Dove sensitive skin, avoid hot or cold showers, aggressive use of emollients including vanicream, cetaphil or cerave discussed with patient.    Risks of non-melanoma skin cancer discussed with patient   Return to clinic 6 months      PROCEDURE NOTE  R nasal tip squamous cell carcinoma in situ   MOHS:   Location    After PGACAC discussed with patient, decision for Mohs surgery was made. Indication for Mohs was Location. Patient confirmed the site with Dr. Santoro.  After anesthesia with LEC, the tumor was excised using standard Mohs technique in 1 stages(s).  CLEAR MARGINS OBTAINED and Final defect size was 1.2 cm.       DERMABRASION: After PGACAC discussed with patient, decision for tangential excision and dermabrasion was made. After anesthesia with Lido/Epi/Clinda and prep with hibiclens, hypertrophic areas were tangentially excised and entire cosmetic unit was smoothed. Hemostasis was obtained with pressure. Patient tolerated procedure well. There were no complications and EBL minimal. Patient advised to keep abraded surfaces covered with generous ointment until healed, approximately 2 weeks. Return to office in 3 months or prn.        Again, thank you for allowing me to participate in the care of your patient.        Sincerely,        Abel Santoro MD

## 2017-10-26 NOTE — MR AVS SNAPSHOT
After Visit Summary   10/26/2017    Stephanie Acuna    MRN: [id number]           Patient Information     Date Of Birth          1955        Visit Information        Provider Department      10/26/2017 7:30 AM Abel Santoro MD [facility]        Today's Diagnoses     SK (seborrheic keratosis)    -  1    Lentigo        Angioma        Squamous cell carcinoma in situ of skin of nasal tip          Care Instructions    Open Wound Care     for __NOSE____________        ? No strenuous activity for 48 hours    ? Take Tylenol as needed for discomfort.                                                .         ? Do not drink alcoholic beverages for 48 hours.    ? Keep the pressure bandage in place for 24 hours. If the bandage becomes blood tinged or loose, reinforce it with gauze and tape.        (Refer to the reverse side of this page for management of bleeding).    ? Remove bandage in 24 hours and begin wound care as follows:     1. Clean area with tap water using a Q tip or gauze pad, (shower / bathe normally)  2. Dry wound with Q tip or gauze pad  3. Apply Aquaphor, Vaseline, Polysporin or Bacitracin Ointment with a Q tip    Do NOT use Neosporin Ointment *  4. Cover the wound with a band-aid or nonstick gauze pad and paper tape.  5. Repeat wound care once a day until wound is completely healed.    It is an old wives tale that a wound heals better when it is exposed to air and allowed to dry out. The wound will heal faster with a better cosmetic result if it is kept moist with ointment and covered with a bandage.  Do not let the wound dry out.      Supplies Needed:                Qtips or gauze pads                Polysporin or Bacitracin Ointment                Bandaids or nonstick gauze pads and paper tape    Wound care kits and brown paper tape are available for purchase at   the pharmacy.    BLEEDIN. Use tightly rolled up gauze or cloth to apply direct  pressure over the bandage for 20   minutes.  2. Reapply pressure for an additional 20 minutes if necessary  3. Call the office or go to the nearest emergency room if pressure fails to stop the bleeding.  4. Use additional gauze and tape to maintain pressure once the bleeding has stopped.  5. Begin wound care 24 hours after surgery as directed.                  WOUND HEALING    1. One week after surgery a pink / red halo will form around the outside of the wound.   This is new skin.  2. The center of the wound will appear yellowish white and produce some drainage.  3. The pink halo will slowly migrate in toward the center of the wound until the wound is covered with new shiny pink skin.  4. There will be no more drainage when the wound is completely healed.  5. It will take six months to one year for the redness to fade.  6. The scar may be itchy, tight and sensitive to extreme temperatures for a year after the surgery.  7. Massaging the area several times a day for several minutes after the wound is completely healed will help the scar soften and normalize faster. Begin massage only after healing is complete.      In case of emergency call: Dr Santoro: [phone]     [facility]: [phone]    [facility]: [phone]            Follow-ups after your visit        Who to contact     If you have questions or need follow up information about today's clinic visit or your schedule please contact [facility] directly at [phone].  Normal or non-critical lab and imaging results will be communicated to you by MyChart, letter or phone within 4 business days after the clinic has received the results. If you do not hear from us within 7 days, please contact the clinic through MyChart or phone. If you have a critical or abnormal lab result, we will notify you by phone as soon as possible.  Submit refill requests through [employer] or call your pharmacy and they will forward the  "refill request to us. Please allow 3 business days for your refill to be completed.          Additional Information About Your Visit        [employer] Information     [employer] gives you secure access to your electronic health record. If you see a primary care provider, you can also send messages to your care team and make appointments. If you have questions, please call your primary care clinic.  If you do not have a primary care provider, please call [phone] and they will assist you.        Care EveryWhere ID     This is your Care EveryWhere ID. This could be used by other organizations to access your [place] medical records  [id number]        Your Vitals Were     Pulse Height Pulse Oximetry BMI (Body Mass Index)          68 1.676 m (5' 6\") 100% 25.66 kg/m2         Blood Pressure from Last 3 Encounters:   10/26/17 102/62   04/24/17 100/64   08/01/16 97/64    Weight from Last 3 Encounters:   10/26/17 72.1 kg (159 lb)   04/24/17 73.5 kg (162 lb)   08/01/16 71.8 kg (158 lb 6.4 oz)              We Performed the Following     MOHS HEAD/NCK/HND/FT/GEN 1ST STAGE UP T0 5 BLOCKS     SHAV SKIN LES >21MM FACE,FACIAL        Primary Care Provider Office Phone # Fax #    Harsha Meyer [phone]       [address] [place] [address]        Equal Access to Services     [facility] AH: Hadii aad ku hadasho Soomaali, waaxda luqadaha, qaybta kaalmada adeegyada, ania dobson. So [facility] [phone].    ATENCIÓN: Si habla español, tiene a banegas disposición servicios gratuitos de asistencia lingüística. Llame al [phone].    We comply with applicable federal civil rights laws and [place] laws. We do not discriminate on the basis of race, color, national origin, age, disability, sex, sexual orientation, or gender identity.            Thank you!     Thank you for choosing [facility]  for your care. Our goal is always to provide you with excellent " care. Hearing back from our patients is one way we can continue to improve our services. Please take a few minutes to complete the written survey that you may receive in the mail after your visit with us. Thank you!             Your Updated Medication List - Protect others around you: Learn how to safely use, store and throw away your medicines at www.disposemymeds.org.          This list is accurate as of: 10/26/17  8:57 AM.  Always use your most recent med list.                   Brand Name Dispense Instructions for use Diagnosis    ADVIL 200 MG tablet   Generic drug:  ibuprofen      Take 600 mg by mouth daily as needed. Prior to golfing        Evening Primrose Oil 1000 MG Caps           fluocinonide 0.05 % solution    LIDEX    60 mL    Apply sparingly to affected area on scalp twice daily as needed.  Do not apply to face.    Seborrheic dermatitis       hydrocortisone valerate 0.2 % ointment    WESTCORT    45 g    Apply thin layer to affected areas on face twice daily as needed    Eczema, unspecified type       naphazoline 0.1 % ophthalmic solution    NAPHCON     1 drop 4 times daily as needed for irritation        OMEGA-3 FISH OIL PO           OSCAL 500/200 D-3 500-200 MG-UNIT per tablet   Generic drug:  calcium carb 1250 mg (500 mg [place])/vitamin D 200 unit     0    1 TABLET DAILY        trimethoprim-polymyxin b ophthalmic solution    POLYTRIM     PLACE 1 DROP INTO AFFECTED EYE QID FOR BACTERIAL CONJUNCTIVITIS        vitamin  s/Minerals Tabs     30    1 TABLET DAILY        zolpidem 5 MG tablet    AMBIEN    30 tablet    Take 0.5-1 tablets (2.5-5 mg) by mouth nightly as needed for sleep    Sleep disturbance

## 2018-03-15 ENCOUNTER — TELEPHONE (OUTPATIENT)
Dept: FAMILY MEDICINE | Facility: CLINIC | Age: 63
End: 2018-03-15

## 2018-03-15 NOTE — TELEPHONE ENCOUNTER
3/15/2018    Attempt 1    Contacted patient in regards to scheduling VIP mammogram  Message on voicemail     Patient is also due for - Other none    Comments: none      Outreach   Rose Holland

## 2018-03-21 NOTE — TELEPHONE ENCOUNTER
3/21/2018    Attempt 2    Contacted patient in regards to scheduling VIP mammogram  Message on voicemail     Patient is also due for - Other none    Comments: previous attempt made      Outreach   Rose Holland

## 2018-04-04 NOTE — TELEPHONE ENCOUNTER
4/4/2018    Attempt 3    Contacted patient in regards to scheduling VIP mammogram  Message on voicemail     Patient is also due for - NONE    Comments:       Outreach   CC

## 2018-06-21 ENCOUNTER — OFFICE VISIT (OUTPATIENT)
Dept: DERMATOLOGY | Facility: CLINIC | Age: 63
End: 2018-06-21
Payer: COMMERCIAL

## 2018-06-21 VITALS — HEART RATE: 66 BPM | OXYGEN SATURATION: 100 % | DIASTOLIC BLOOD PRESSURE: 65 MMHG | SYSTOLIC BLOOD PRESSURE: 105 MMHG

## 2018-06-21 DIAGNOSIS — L82.1 SK (SEBORRHEIC KERATOSIS): ICD-10-CM

## 2018-06-21 DIAGNOSIS — L81.4 LENTIGO: ICD-10-CM

## 2018-06-21 DIAGNOSIS — Z85.828 HISTORY OF SKIN CANCER: Primary | ICD-10-CM

## 2018-06-21 PROCEDURE — 99213 OFFICE O/P EST LOW 20 MIN: CPT | Performed by: DERMATOLOGY

## 2018-06-21 NOTE — PROGRESS NOTES
Stephanie Acuna is a 62 year old year old female patient here today for f/u hx of non-melanoma skin cancer.  She denies any new or changing skin lesions.  Patient reports the following previous treatments none.  Patient reports the following modifying factors none.  Associated symptoms: none.  Patient has no other skin complaints today.  Remainder of the HPI, Meds, PMH, Allergies, FH, and SH was reviewed in chart.      Past Medical History:   Diagnosis Date     Abscess of Bartholin's gland     I&D, marsupalization x 2     Chondromalacia of patella     right knee arthroscopy/lateral release     Chronic sinusitis     recurrent sinusitis     Eczema      Enlargement of sternoclavicular joint 2003    painless right sternoclavicular joint arthritis     Migraine     stopped after menopause     Osteopenia 2010    minimal, femoral neck     Seborrheic dermatitis 2004    face     Shrimp allergy     with only GI symtpoms     Symptomatic menopausal or female climacteric states      Tinnitus of right ear        Past Surgical History:   Procedure Laterality Date     COLONOSCOPY  2009    normal, repeat 7-10 years     HC EXCIS BARTHOLIN GLAND/CYST  early 80s    left     HC KNEE SCOPE, DIAGNOSTIC      right arthroscopy/lateral release     INJECTION BURSA  2009    left trochanteric bursa injection     SURGICAL HISTORY OF -       right supraclavicular lipoma excised        Family History   Problem Relation Age of Onset     Breast Cancer Mother      onset 76     Hypertension Mother      HEART DISEASE Mother      pacemaker mid 80s     Cerebrovascular Disease Mother      onset age 90     Prostate Cancer Father      onset age 76     Respiratory Father       of aspiration pneumonia     Neurologic Disorder Father      dementia, onset 80s/seizures(Dilantin) - benign tumor/noise related hearing loss       Social History     Social History     Marital status:      Spouse name: Amandeep     Number of  children: 0     Years of education: 16     Occupational History     teacher Retired     high school, 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[employer]      [place] [employer]     Social History Main Topics     Smoking status: Never Smoker     Smokeless tobacco: Never Used     Alcohol use 0.0 oz/week     0 Standard drinks or equivalent per week      Comment: 2-5 glass of wine per week     Drug use: No     Sexual activity: Yes      Comment: same and only partner since 1970     Other Topics Concern      Service No     Blood Transfusions No     Caffeine Concern No     Occupational Exposure No     Hobby Hazards No     Sleep Concern Yes     chantel menopausal     Stress Concern No     Weight Concern No     Special Diet No     one yogurt per day, one glass milk per day     Back Care No     Exercise Yes     golf 3 x per week, treadmill 60 in 3 day per week, snow ski     Bike Helmet No     Seat Belt Yes     Self-Exams Yes     Social History Narrative    Lives with .  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  Now working with her  2 days per week - [employer].         Outpatient Encounter Prescriptions as of 6/21/2018   Medication Sig Dispense Refill     Evening Primrose Oil 1000 MG CAPS        fluocinonide (LIDEX) 0.05 % solution Apply sparingly to affected area on scalp twice daily as needed.  Do not apply to face. 60 mL 0     hydrocortisone valerate (WESTCORT) 0.2 % ointment Apply thin layer to affected areas on face twice daily as needed 45 g 2     ibuprofen (ADVIL) 200 MG tablet Take 600 mg by mouth daily as needed. Prior to golfing       naphazoline (NAPHCON) 0.1 % ophthalmic solution 1 drop 4 times daily as needed for irritation       Omega-3 Fatty Acids (OMEGA-3 FISH OIL PO)        OSCAL 500/200 D-3 500-200 MG-UNIT OR TABS 1 TABLET DAILY 0 0     trimethoprim-polymyxin b (POLYTRIM) ophthalmic solution PLACE 1 DROP INTO AFFECTED EYE QID FOR BACTERIAL CONJUNCTIVITIS  0      VITAMINS/MINERALS OR TABS 1 TABLET DAILY 30 0     zolpidem (AMBIEN) 5 MG tablet Take 0.5-1 tablets (2.5-5 mg) by mouth nightly as needed for sleep 30 tablet 5     No facility-administered encounter medications on file as of 6/21/2018.              Review Of Systems  Skin: As above  Eyes: negative  Ears/Nose/Throat: negative  Respiratory: No shortness of breath, dyspnea on exertion, cough, or hemoptysis  Cardiovascular: negative  Gastrointestinal: negative  Genitourinary: negative  Musculoskeletal: negative  Neurologic: negative  Psychiatric: negative  Hematologic/Lymphatic/Immunologic: negative  Endocrine: negative      O:   NAD, WDWN, Alert & Oriented, Mood & Affect wnl, Vitals stable   Here today alone   /65  Pulse 66  SpO2 100%   General appearance normal   Vitals stable   Alert, oriented and in no acute distress      Following lymph nodes palpated: Occipital, Cervical, Supraclavicular no lad   Stuck on papules and brown macules on trunk and ext    Nose well healed        The remainder of the full exam was unremarkable; the following areas were examined:  conjunctiva/lids, oral mucosa, neck, peripheral vascular system, abdomen, lymph nodes, digits/nails, eccrine and apocrine glands, scalp/hair, face, neck, chest, abdomen, buttocks, back, RUE, LUE, RLE, LLE       Eyes: Conjunctivae/lids:Normal     ENT: Lips, buccal mucosa, tongue: normal    MSK:Normal    Cardiovascular: peripheral edema none    Pulm: Breathing Normal    Lymph Nodes: No Head and Neck Lymphadenopathy     Neuro/Psych: Orientation:Normal; Mood/Affect:Normal      A/P:  1. Seborrheic keratosis, letnigo, hx of non-melanoma skin cancer   BENIGN LESIONS DISCUSSED WITH PATIENT:  I discussed the specifics of tumor, prognosis, and genetics of benign lesions.  I explained that treatment of these lesions would be purely cosmetic and not medically neccessary.  I discussed with patient different removal options including excision, cautery and /or laser.       Nature and genetics of benign skin lesions dicussed with patient.  Signs and Symptoms of skin cancer discussed with patient.  ABCDEs of melanoma reviewed with patient.  Patient encouraged to perform monthly skin exams.  UV precautions reviewed with patient.  Patient to follow up with Primary Care provider regarding elevated blood pressure.  Skin care regimen reviewed with patient: Eliminate harsh soaps, i.e. Dial, zest, irsih spring; Mild soaps such as Cetaphil or Dove sensitive skin, avoid hot or cold showers, aggressive use of emollients including vanicream, cetaphil or cerave discussed with patient.    Risks of non-melanoma skin cancer discussed with patient   Return to clinic 12 months

## 2018-06-21 NOTE — MR AVS SNAPSHOT
After Visit Summary   6/21/2018    Stephanie Acuna    MRN: [id number]           Patient Information     Date Of Birth          1955        Visit Information        Provider Department      6/21/2018 8:45 AM Abel Santoro MD [facility]        Today's Diagnoses     History of skin cancer    -  1    Lentigo        SK (seborrheic keratosis)           Follow-ups after your visit        Your next 10 appointments already scheduled     Jun 27, 2018  4:20 PM CDT   Office Visit with Andie Craig MD   [facility] ([facility]    [address] [place] [id number] [phone]           Bring a current list of meds and any records pertaining to this visit. For Physicals, please bring immunization records and any forms needing to be filled out. Please arrive 10 minutes early to complete paperwork.              Who to contact     If you have questions or need follow up information about today's clinic visit or your schedule please contact [facility] directly at [phone].  Normal or non-critical lab and imaging results will be communicated to you by [email or web address], letter or phone within 4 business days after the clinic has received the results. If you do not hear from us within 7 days, please contact the clinic through CFBankt or phone. If you have a critical or abnormal lab result, we will notify you by phone as soon as possible.  Submit refill requests through Vokle or call your pharmacy and they will forward the refill request to us. Please allow 3 business days for your refill to be completed.          Additional Information About Your Visit        MyChart Information     Vokle gives you secure access to your electronic health record. If you see a primary care provider, you can also send messages to your care team and make appointments. If you have questions, please call  your primary care clinic.  If you do not have a primary care provider, please call [phone] and they will assist you.        Care EveryWhere ID     This is your Care EveryWhere ID. This could be used by other organizations to access your [facility] medical records  [id number]        Your Vitals Were     Pulse Pulse Oximetry                66 100%           Blood Pressure from Last 3 Encounters:   06/21/18 105/65   10/26/17 102/62   04/24/17 100/64    Weight from Last 3 Encounters:   10/26/17 72.1 kg (159 lb)   04/24/17 73.5 kg (162 lb)   08/01/16 71.8 kg (158 lb 6.4 oz)              Today, you had the following     No orders found for display       Primary Care Provider Office Phone # Fax #    Harsha Meyer [phone]       [address]  [place] [address]        Equal Access to Services     [facility]: Hadii aad ku hadasho Soomaali, waaxda luqadaha, qaybta kaalmada adeegyada, waxay stevein haybetsyn radha shelton . So [facility] [phone].    ATENCIÓN: Si habla español, tiene a banegas disposición servicios gratuitos de asistencia lingüística. Llame al [phone].    We comply with applicable federal civil rights laws and [place] laws. We do not discriminate on the basis of race, color, national origin, age, disability, sex, sexual orientation, or gender identity.            Thank you!     Thank you for choosing [facility]  for your care. Our goal is always to provide you with excellent care. Hearing back from our patients is one way we can continue to improve our services. Please take a few minutes to complete the written survey that you may receive in the mail after your visit with us. Thank you!             Your Updated Medication List - Protect others around you: Learn how to safely use, store and throw away your medicines at www.disposemymeds.org.          This list is accurate as of 6/21/18  9:10 AM.  Always use your most recent med list.                    Brand Name Dispense Instructions for use Diagnosis    ADVIL 200 MG tablet   Generic drug:  ibuprofen      Take 600 mg by mouth daily as needed. Prior to golfing        Evening Primrose Oil 1000 MG Caps           fluocinonide 0.05 % solution    LIDEX    60 mL    Apply sparingly to affected area on scalp twice daily as needed.  Do not apply to face.    Seborrheic dermatitis       hydrocortisone valerate 0.2 % ointment    WESTCORT    45 g    Apply thin layer to affected areas on face twice daily as needed    Eczema, unspecified type       naphazoline 0.1 % ophthalmic solution    NAPHCON     1 drop 4 times daily as needed for irritation        OMEGA-3 FISH OIL PO           OSCAL 500/200 D-3 500-200 MG-UNIT per tablet   Generic drug:  calcium carb 1250 mg (500 mg Resighini)/vitamin D 200 unit     0    1 TABLET DAILY        trimethoprim-polymyxin b ophthalmic solution    POLYTRIM     PLACE 1 DROP INTO AFFECTED EYE QID FOR BACTERIAL CONJUNCTIVITIS        vitamin  s/Minerals Tabs     30    1 TABLET DAILY        zolpidem 5 MG tablet    AMBIEN    30 tablet    Take 0.5-1 tablets (2.5-5 mg) by mouth nightly as needed for sleep    Sleep disturbance

## 2018-06-21 NOTE — LETTER
6/21/2018         RE: Stephanie Acuna  [address]  [address]  [place] [address]        Dear Colleague,    Thank you for referring your patient, Stephanie Acuna, to the [facility]. Please see a copy of my visit note below.    Stephanie Acuna is a 62 year old year old female patient here today for f/u hx of non-melanoma skin cancer.  She denies any new or changing skin lesions.  Patient reports the following previous treatments none.  Patient reports the following modifying factors none.  Associated symptoms: none.  Patient has no other skin complaints today.  Remainder of the HPI, Meds, PMH, Allergies, FH, and SH was reviewed in chart.      Past Medical History:   Diagnosis Date     Abscess of Bartholin's gland 1980s    I&D, marsupalization x 2     Chondromalacia of patella 1995    right knee arthroscopy/lateral release     Chronic sinusitis     recurrent sinusitis     Eczema      Enlargement of sternoclavicular joint 2003    painless right sternoclavicular joint arthritis     Migraine     stopped after menopause     Osteopenia 2010    minimal, femoral neck     Seborrheic dermatitis 2004    face     Shrimp allergy     with only GI symtpoms     Symptomatic menopausal or female climacteric states 2005     Tinnitus of right ear        Past Surgical History:   Procedure Laterality Date     COLONOSCOPY  11/2009    normal, repeat 7-10 years     HC EXCIS BARTHOLIN GLAND/CYST  early 80s    left     HC KNEE SCOPE, DIAGNOSTIC  1995    right arthroscopy/lateral release     INJECTION BURSA  11/2009    left trochanteric bursa injection     SURGICAL HISTORY OF -   1971    right supraclavicular lipoma excised        Family History   Problem Relation Age of Onset     Breast Cancer Mother      onset 76     Hypertension Mother      HEART DISEASE Mother      pacemaker mid 80s     Cerebrovascular Disease Mother      onset age 90     Prostate Cancer Father      onset age 76      Respiratory Father       of aspiration pneumonia     Neurologic Disorder Father      dementia, onset 80s/seizures(Dilantin) - benign tumor/noise related hearing loss       Social History     Social History     Marital status:      Spouse name: Amandeep     Number of children: 0     Years of education: 16     Occupational History     teacher Retired     high school, 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 [employer]     Social History Main Topics     Smoking status: Never Smoker     Smokeless tobacco: Never Used     Alcohol use 0.0 oz/week     0 Standard drinks or equivalent per week      Comment: 2-5 glass of wine per week     Drug use: No     Sexual activity: Yes      Comment: same and only partner since      Other Topics Concern      Service No     Blood Transfusions No     Caffeine Concern No     Occupational Exposure No     Hobby Hazards No     Sleep Concern Yes     chantel menopausal     Stress Concern No     Weight Concern No     Special Diet No     one yogurt per day, one glass milk per day     Back Care No     Exercise Yes     golf 3 x per week, treadmill 60 in 3 day per week, snow ski     Bike Helmet No     Seat Belt Yes     Self-Exams Yes     Social History Narrative    Lives with .  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         Outpatient Encounter Prescriptions as of 2018   Medication Sig Dispense Refill     Evening Primrose Oil 1000 MG CAPS        fluocinonide (LIDEX) 0.05 % solution Apply sparingly to affected area on scalp twice daily as needed.  Do not apply to face. 60 mL 0     hydrocortisone valerate (WESTCORT) 0.2 % ointment Apply thin layer to affected areas on face twice daily as needed 45 g 2     ibuprofen (ADVIL) 200 MG tablet Take 600 mg by mouth daily as needed. Prior to golfing       naphazoline (NAPHCON) 0.1 % ophthalmic solution 1 drop 4 times daily as  needed for irritation       Omega-3 Fatty Acids (OMEGA-3 FISH OIL PO)        OSCAL 500/200 D-3 500-200 MG-UNIT OR TABS 1 TABLET DAILY 0 0     trimethoprim-polymyxin b (POLYTRIM) ophthalmic solution PLACE 1 DROP INTO AFFECTED EYE QID FOR BACTERIAL CONJUNCTIVITIS  0     VITAMINS/MINERALS OR TABS 1 TABLET DAILY 30 0     zolpidem (AMBIEN) 5 MG tablet Take 0.5-1 tablets (2.5-5 mg) by mouth nightly as needed for sleep 30 tablet 5     No facility-administered encounter medications on file as of 6/21/2018.              Review Of Systems  Skin: As above  Eyes: negative  Ears/Nose/Throat: negative  Respiratory: No shortness of breath, dyspnea on exertion, cough, or hemoptysis  Cardiovascular: negative  Gastrointestinal: negative  Genitourinary: negative  Musculoskeletal: negative  Neurologic: negative  Psychiatric: negative  Hematologic/Lymphatic/Immunologic: negative  Endocrine: negative      O:   NAD, WDWN, Alert & Oriented, Mood & Affect wnl, Vitals stable   Here today alone   /65  Pulse 66  SpO2 100%   General appearance normal   Vitals stable   Alert, oriented and in no acute distress      Following lymph nodes palpated: Occipital, Cervical, Supraclavicular no lad   Stuck on papules and brown macules on trunk and ext    Nose well healed        The remainder of the full exam was unremarkable; the following areas were examined:  conjunctiva/lids, oral mucosa, neck, peripheral vascular system, abdomen, lymph nodes, digits/nails, eccrine and apocrine glands, scalp/hair, face, neck, chest, abdomen, buttocks, back, RUE, LUE, RLE, LLE       Eyes: Conjunctivae/lids:Normal     ENT: Lips, buccal mucosa, tongue: normal    MSK:Normal    Cardiovascular: peripheral edema none    Pulm: Breathing Normal    Lymph Nodes: No Head and Neck Lymphadenopathy     Neuro/Psych: Orientation:Normal; Mood/Affect:Normal      A/P:  1. Seborrheic keratosis, letnigo, hx of non-melanoma skin cancer   BENIGN LESIONS DISCUSSED WITH PATIENT:  I  discussed the specifics of tumor, prognosis, and genetics of benign lesions.  I explained that treatment of these lesions would be purely cosmetic and not medically neccessary.  I discussed with patient different removal options including excision, cautery and /or laser.      Nature and genetics of benign skin lesions dicussed with patient.  Signs and Symptoms of skin cancer discussed with patient.  ABCDEs of melanoma reviewed with patient.  Patient encouraged to perform monthly skin exams.  UV precautions reviewed with patient.  Patient to follow up with Primary Care provider regarding elevated blood pressure.  Skin care regimen reviewed with patient: Eliminate harsh soaps, i.e. Dial, zest, irsih spring; Mild soaps such as Cetaphil or Dove sensitive skin, avoid hot or cold showers, aggressive use of emollients including vanicream, cetaphil or cerave discussed with patient.    Risks of non-melanoma skin cancer discussed with patient   Return to clinic 12 months        Again, thank you for allowing me to participate in the care of your patient.        Sincerely,        Abel Santoro MD

## 2018-06-29 ENCOUNTER — OFFICE VISIT (OUTPATIENT)
Dept: FAMILY MEDICINE | Facility: CLINIC | Age: 63
End: 2018-06-29
Payer: COMMERCIAL

## 2018-06-29 VITALS
WEIGHT: 165.8 LBS | OXYGEN SATURATION: 100 % | TEMPERATURE: 98 F | HEART RATE: 70 BPM | HEIGHT: 66 IN | BODY MASS INDEX: 26.65 KG/M2 | DIASTOLIC BLOOD PRESSURE: 74 MMHG | SYSTOLIC BLOOD PRESSURE: 110 MMHG

## 2018-06-29 DIAGNOSIS — R05.9 COUGH: ICD-10-CM

## 2018-06-29 DIAGNOSIS — R07.0 THROAT PAIN: Primary | ICD-10-CM

## 2018-06-29 DIAGNOSIS — J06.9 VIRAL URI WITH COUGH: ICD-10-CM

## 2018-06-29 LAB
DEPRECATED S PYO AG THROAT QL EIA: NORMAL
SPECIMEN SOURCE: NORMAL

## 2018-06-29 PROCEDURE — 99213 OFFICE O/P EST LOW 20 MIN: CPT | Performed by: FAMILY MEDICINE

## 2018-06-29 PROCEDURE — 87081 CULTURE SCREEN ONLY: CPT | Performed by: FAMILY MEDICINE

## 2018-06-29 PROCEDURE — 87880 STREP A ASSAY W/OPTIC: CPT | Performed by: FAMILY MEDICINE

## 2018-06-29 RX ORDER — CODEINE PHOSPHATE AND GUAIFENESIN 10; 100 MG/5ML; MG/5ML
1 SOLUTION ORAL
Qty: 120 ML | Refills: 0 | Status: SHIPPED | OUTPATIENT
Start: 2018-06-29

## 2018-06-29 NOTE — MR AVS SNAPSHOT
After Visit Summary   6/29/2018    Stephanie Acuna    MRN: [id number]           Patient Information     Date Of Birth          1955        Visit Information        Provider Department      6/29/2018 4:20 PM Jeff Perales MD [facility] [place]        Today's Diagnoses     Throat pain    -  1    Cough        Viral URI with cough           Follow-ups after your visit        Follow-up notes from your care team     Return in about 1 week (around 7/6/2018) for if symptom does not get better.      Your next 10 appointments already scheduled     Jun 29, 2018  4:20 PM CDT   Office Visit with Jeff Perales MD   [facility] [place] ([facility])    [address] [address] [phone]           Bring a current list of meds and any records pertaining to this visit. For Physicals, please bring immunization records and any forms needing to be filled out. Please arrive 10 minutes early to complete paperwork.              Who to contact     If you have questions or need follow up information about today's clinic visit or your schedule please contact [facility] [place] directly at [phone].  Normal or non-critical lab and imaging results will be communicated to you by MyChart, letter or phone within 4 business days after the clinic has received the results. If you do not hear from us within 7 days, please contact the clinic through MyChart or phone. If you have a critical or abnormal lab result, we will notify you by phone as soon as possible.  Submit refill requests through qunb or call your pharmacy and they will forward the refill request to us. Please allow 3 business days for your refill to be completed.          Additional Information About Your Visit        MyChart Information     qunb gives you secure access to your electronic health record. If you see a primary care provider, you can also send messages to your care  "team and make appointments. If you have questions, please call your primary care clinic.  If you do not have a primary care provider, please call [phone] and they will assist you.        Care EveryWhere ID     This is your Care EveryWhere ID. This could be used by other organizations to access your [place] medical records  [id number]        Your Vitals Were     Pulse Temperature Height Pulse Oximetry Breastfeeding? BMI (Body Mass Index)    70 98  F (36.7  C) (Tympanic) 5' 6\" (1.676 m) 100% No 26.76 kg/m2       Blood Pressure from Last 3 Encounters:   06/29/18 110/74   06/21/18 105/65   10/26/17 102/62    Weight from Last 3 Encounters:   06/29/18 165 lb 12.8 oz (75.2 kg)   10/26/17 159 lb (72.1 kg)   04/24/17 162 lb (73.5 kg)              We Performed the Following     Beta strep group A culture     Strep, Rapid Screen          Today's Medication Changes          These changes are accurate as of 6/29/18  4:08 PM.  If you have any questions, ask your nurse or doctor.               Start taking these medicines.        Dose/Directions    guaiFENesin-codeine 100-10 MG/5ML Soln solution   Commonly known as:  ROBITUSSIN AC   Used for:  Cough   Started by:  Jeff Perales MD        Dose:  1 tsp.   Take 5 mLs by mouth nightly as needed for cough   Quantity:  120 mL   Refills:  0            Where to get your medicines      Some of these will need a paper prescription and others can be bought over the counter.  Ask your nurse if you have questions.     Bring a paper prescription for each of these medications     guaiFENesin-codeine 100-10 MG/5ML Soln solution                Primary Care Provider Office Phone # Fax #    Harsha Meyer [phone]       [address]  [place] [address]        Equal Access to Services     CHAYO REDDY : Norris Bowman, waaxda luqadaha, qaybta kaalmada esa, ania dobson. So [facility] [phone].    ATENCIÓN: Si habla " español, tiene a banegas disposición servicios gratuitos de asistencia lingüística. Rangel tadeo [phone].    We comply with applicable federal civil rights laws and [place] laws. We do not discriminate on the basis of race, color, national origin, age, disability, sex, sexual orientation, or gender identity.            Thank you!     Thank you for choosing [facility] [place]  for your care. Our goal is always to provide you with excellent care. Hearing back from our patients is one way we can continue to improve our services. Please take a few minutes to complete the written survey that you may receive in the mail after your visit with us. Thank you!             Your Updated Medication List - Protect others around you: Learn how to safely use, store and throw away your medicines at www.disposemymeds.org.          This list is accurate as of 6/29/18  4:08 PM.  Always use your most recent med list.                   Brand Name Dispense Instructions for use Diagnosis    ADVIL 200 MG tablet   Generic drug:  ibuprofen      Take 600 mg by mouth daily as needed. Prior to golfing        Evening Primrose Oil 1000 MG Caps           fluocinonide 0.05 % solution    LIDEX    60 mL    Apply sparingly to affected area on scalp twice daily as needed.  Do not apply to face.    Seborrheic dermatitis       guaiFENesin-codeine 100-10 MG/5ML Soln solution    ROBITUSSIN AC    120 mL    Take 5 mLs by mouth nightly as needed for cough    Cough       hydrocortisone valerate 0.2 % ointment    WESTCORT    45 g    Apply thin layer to affected areas on face twice daily as needed    Eczema, unspecified type       naphazoline 0.1 % ophthalmic solution    NAPHCON     1 drop 4 times daily as needed for irritation        OMEGA-3 FISH OIL PO           OSCAL 500/200 D-3 500-200 MG-UNIT per tablet   Generic drug:  calcium carb 1250 mg (500 mg [place])/vitamin D 200 unit     0    1 TABLET DAILY        trimethoprim-polymyxin b ophthalmic solution     POLYTRIM     PLACE 1 DROP INTO AFFECTED EYE QID FOR BACTERIAL CONJUNCTIVITIS        vitamin  s/Minerals Tabs     30    1 TABLET DAILY        zolpidem 5 MG tablet    AMBIEN    30 tablet    Take 0.5-1 tablets (2.5-5 mg) by mouth nightly as needed for sleep    Sleep disturbance

## 2018-06-29 NOTE — PROGRESS NOTES
SUBJECTIVE:   Stephanie Acuna is a 62 year old female who presents to clinic today for the following health issues:      Acute Illness   Acute illness concerns: cough   Onset: x 2 days     Fever: no    Chills/Sweats: no    Headache (location?): YES    Sinus Pressure:no    Conjunctivitis:  no    Ear Pain: no    Rhinorrhea: no    Congestion: YES-  Mild     Sore Throat: YES- x 5 days      Cough: YES    Wheeze: no    Decreased Appetite: YES    Nausea: no    Vomiting: no    Diarrhea:  no    Dysuria/Freq.: no    Fatigue/Achiness: YES    Sick/Strep Exposure: no     Therapies Tried and outcome: Tylenol - shows improvement           Problem list and histories reviewed & adjusted, as indicated.      Patient Active Problem List   Diagnosis     Seborrheic dermatitis     Chondromalacia of patella     Chronic sinusitis     Symptomatic menopausal or female climacteric states     CARDIOVASCULAR SCREENING; LDL GOAL LESS THAN 160     Osteopenia     Advanced directives, counseling/discussion     Eczema     It band syndrome, right     Right knee pain     Past Surgical History:   Procedure Laterality Date     COLONOSCOPY  2009    normal, repeat 7-10 years     HC EXCIS BARTHOLIN GLAND/CYST  early 80s    left     HC KNEE SCOPE, DIAGNOSTIC      right arthroscopy/lateral release     INJECTION BURSA  2009    left trochanteric bursa injection     SURGICAL HISTORY OF -       right supraclavicular lipoma excised       Social History   Substance Use Topics     Smoking status: Never Smoker     Smokeless tobacco: Never Used     Alcohol use 0.0 oz/week     0 Standard drinks or equivalent per week      Comment: 2-5 glass of wine per week     Family History   Problem Relation Age of Onset     Breast Cancer Mother      onset 76     Hypertension Mother      HEART DISEASE Mother      pacemaker mid 80s     Cerebrovascular Disease Mother      onset age 90     Prostate Cancer Father      onset age 76     Respiratory Father       of  "aspiration pneumonia     Neurologic Disorder Father      dementia, onset 80s/seizures(Dilantin) - benign tumor/noise related hearing loss         Current Outpatient Prescriptions   Medication Sig Dispense Refill     Evening Primrose Oil 1000 MG CAPS        fluocinonide (LIDEX) 0.05 % solution Apply sparingly to affected area on scalp twice daily as needed.  Do not apply to face. 60 mL 0     guaiFENesin-codeine (ROBITUSSIN AC) 100-10 MG/5ML SOLN solution Take 5 mLs by mouth nightly as needed for cough 120 mL 0     hydrocortisone valerate (WESTCORT) 0.2 % ointment Apply thin layer to affected areas on face twice daily as needed 45 g 2     naphazoline (NAPHCON) 0.1 % ophthalmic solution 1 drop 4 times daily as needed for irritation       Omega-3 Fatty Acids (OMEGA-3 FISH OIL PO)        trimethoprim-polymyxin b (POLYTRIM) ophthalmic solution PLACE 1 DROP INTO AFFECTED EYE QID FOR BACTERIAL CONJUNCTIVITIS  0     VITAMINS/MINERALS OR TABS 1 TABLET DAILY 30 0     zolpidem (AMBIEN) 5 MG tablet Take 0.5-1 tablets (2.5-5 mg) by mouth nightly as needed for sleep 30 tablet 5     ibuprofen (ADVIL) 200 MG tablet Take 600 mg by mouth daily as needed. Prior to golfing       OSCAL 500/200 D-3 500-200 MG-UNIT OR TABS 1 TABLET DAILY 0 0       Reviewed and updated as needed this visit by clinical staff  Tobacco  Allergies  Meds  Med Hx  Surg Hx  Fam Hx  Soc Hx      Reviewed and updated as needed this visit by Provider         ROS:  CONSTITUTIONAL: NEGATIVE for fever, chills, change in weight  ENT/MOUTH: NEGATIVE for ear, mouth and throat problems  RESP:POSITIVE for cough-non productive  CV: NEGATIVE for chest pain, palpitations or peripheral edema    OBJECTIVE:                                                    /74 (BP Location: Left arm, Patient Position: Chair, Cuff Size: Adult Regular)  Pulse 70  Temp 98  F (36.7  C) (Tympanic)  Ht 5' 6\" (1.676 m)  Wt 165 lb 12.8 oz (75.2 kg)  SpO2 100%  Breastfeeding? No  BMI " 26.76 kg/m2  Body mass index is 26.76 kg/(m^2).   GENERAL: healthy, alert, well nourished, well hydrated, no distress  HENT: ear canals- normal; TMs- normal; Nose- normal; Mouth- no ulcers, no lesions  NECK: no tenderness, no adenopathy, no asymmetry, no masses, no stiffness; thyroid- normal to palpation  RESP: lungs clear to auscultation - no rales, no rhonchi, no wheezes  CV: regular rates and rhythm, normal S1 S2, no S3 or S4 and no murmur, no click or rub -       ASSESSMENT/PLAN:                                                        ICD-10-CM    1. Throat pain R07.0 Strep, Rapid Screen     Beta strep group A culture   2. Cough R05 guaiFENesin-codeine (ROBITUSSIN AC) 100-10 MG/5ML SOLN solution   3. Viral URI with cough J06.9     B97.89        Symptoms most likely upper respiratory viral in etiology or some allergic component.  Suggested over-the-counter allergy medication.  She has some cough which is nonproductive.  Use cough medication as needed.  Symptomatic care is discussed with the patient.    Jeff Perales MD  [facility] [place]

## 2018-06-30 LAB
BACTERIA SPEC CULT: NORMAL
SPECIMEN SOURCE: NORMAL

## 2018-09-15 ENCOUNTER — HEALTH MAINTENANCE LETTER (OUTPATIENT)
Age: 63
End: 2018-09-15

## 2019-12-15 ENCOUNTER — HEALTH MAINTENANCE LETTER (OUTPATIENT)
Age: 64
End: 2019-12-15

## 2021-01-15 ENCOUNTER — HEALTH MAINTENANCE LETTER (OUTPATIENT)
Age: 66
End: 2021-01-15

## 2021-09-04 ENCOUNTER — HEALTH MAINTENANCE LETTER (OUTPATIENT)
Age: 66
End: 2021-09-04

## 2022-02-13 ENCOUNTER — HEALTH MAINTENANCE LETTER (OUTPATIENT)
Age: 67
End: 2022-02-13

## 2022-07-30 ENCOUNTER — HOSPITAL ENCOUNTER (EMERGENCY)
Facility: OTHER | Age: 67
Discharge: HOME | End: 2022-07-30
Attending: INTERNAL MEDICINE | Admitting: INTERNAL MEDICINE
Payer: MEDICARE

## 2022-07-30 VITALS
TEMPERATURE: 98.2 F | RESPIRATION RATE: 16 BRPM | DIASTOLIC BLOOD PRESSURE: 61 MMHG | HEART RATE: 71 BPM | SYSTOLIC BLOOD PRESSURE: 120 MMHG | OXYGEN SATURATION: 96 % | BODY MASS INDEX: 26.63 KG/M2 | WEIGHT: 165 LBS

## 2022-07-30 DIAGNOSIS — S05.01XA ABRASION OF RIGHT CORNEA, INITIAL ENCOUNTER: ICD-10-CM

## 2022-07-30 DIAGNOSIS — S05.01XA CONJUNCTIVAL ABRASION, RIGHT, INITIAL ENCOUNTER: ICD-10-CM

## 2022-07-30 PROCEDURE — 99283 EMERGENCY DEPT VISIT LOW MDM: CPT | Performed by: INTERNAL MEDICINE

## 2022-07-30 PROCEDURE — 250N000009 HC RX 250: Performed by: INTERNAL MEDICINE

## 2022-07-30 RX ORDER — ERYTHROMYCIN 5 MG/G
OINTMENT OPHTHALMIC ONCE
Status: COMPLETED | OUTPATIENT
Start: 2022-07-30 | End: 2022-07-30

## 2022-07-30 RX ORDER — TETRACAINE HYDROCHLORIDE 5 MG/ML
1-2 SOLUTION OPHTHALMIC ONCE
Status: COMPLETED | OUTPATIENT
Start: 2022-07-30 | End: 2022-07-30

## 2022-07-30 RX ORDER — ERYTHROMYCIN 5 MG/G
0.5 OINTMENT OPHTHALMIC 4 TIMES DAILY
Qty: 3.5 G | Refills: 0 | Status: SHIPPED | OUTPATIENT
Start: 2022-07-30

## 2022-07-30 RX ADMIN — TETRACAINE HYDROCHLORIDE 2 DROP: 5 SOLUTION OPHTHALMIC at 21:00

## 2022-07-30 RX ADMIN — FLUORESCEIN SODIUM 1 STRIP: 1 STRIP OPHTHALMIC at 21:00

## 2022-07-30 RX ADMIN — ERYTHROMYCIN 1 G: 5 OINTMENT OPHTHALMIC at 21:21
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
## 2022-07-31 NOTE — DISCHARGE INSTRUCTIONS
Use erythromycin ophthalmic ointment 4 times daily for next 5 to 7 days.     Follow-up as needed for new / worsening symptoms.

## 2022-10-16 ENCOUNTER — HEALTH MAINTENANCE LETTER (OUTPATIENT)
Age: 67
End: 2022-10-16

## 2024-06-01 ENCOUNTER — HEALTH MAINTENANCE LETTER (OUTPATIENT)
Age: 69
End: 2024-06-01

## (undated) RX ORDER — TETRACAINE HYDROCHLORIDE 5 MG/ML
SOLUTION OPHTHALMIC
Status: DISPENSED
Start: 2022-07-30

## (undated) RX ORDER — ERYTHROMYCIN 5 MG/G
OINTMENT OPHTHALMIC
Status: DISPENSED
Start: 2022-07-30